# Patient Record
Sex: MALE | Race: BLACK OR AFRICAN AMERICAN | Employment: OTHER
[De-identification: names, ages, dates, MRNs, and addresses within clinical notes are randomized per-mention and may not be internally consistent; named-entity substitution may affect disease eponyms.]

---

## 2024-11-18 ENCOUNTER — CARE COORDINATION (OUTPATIENT)
Dept: OTHER | Facility: CLINIC | Age: 54
End: 2024-11-18

## 2024-11-18 RX ORDER — SERTRALINE HYDROCHLORIDE 100 MG/1
100 TABLET, FILM COATED ORAL PRN
COMMUNITY

## 2024-11-18 RX ORDER — SILDENAFIL 100 MG/1
100 TABLET, FILM COATED ORAL PRN
COMMUNITY

## 2024-11-18 RX ORDER — EZETIMIBE 10 MG/1
10 TABLET ORAL
COMMUNITY

## 2024-11-18 RX ORDER — CLONIDINE HYDROCHLORIDE 0.2 MG/1
0.2 TABLET ORAL
COMMUNITY

## 2024-11-18 RX ORDER — POTASSIUM CHLORIDE 1500 MG/1
40 TABLET, EXTENDED RELEASE ORAL DAILY
COMMUNITY

## 2024-11-18 RX ORDER — LEVOCETIRIZINE DIHYDROCHLORIDE 5 MG/1
TABLET, FILM COATED ORAL
COMMUNITY

## 2024-11-18 RX ORDER — AZELASTINE 1 MG/ML
1 SPRAY, METERED NASAL
COMMUNITY

## 2024-11-18 RX ORDER — AMLODIPINE BESYLATE 10 MG/1
5 TABLET ORAL DAILY
COMMUNITY

## 2024-11-18 RX ORDER — CARVEDILOL 25 MG/1
25 TABLET ORAL
COMMUNITY

## 2024-11-18 RX ORDER — TELMISARTAN 40 MG/1
80 TABLET ORAL
COMMUNITY

## 2024-11-18 RX ORDER — ASPIRIN 81 MG/1
81 TABLET ORAL DAILY
COMMUNITY

## 2024-11-18 NOTE — CARE COORDINATION
Ambulatory Care Coordination Note     2024 2:53 PM     Patient Current Location:  [place]     This patient was received as a referral from Population health report .    ACM contacted the patient by telephone. Verified name and  with patient as identifiers. Provided introduction to self, and explanation of the ACM role.   Patient accepted care management services at this time.          ACM: Bill Cordon RN     Challenges to be reviewed by the provider   Additional needs identified to be addressed with provider No  none               Method of communication with provider: none.    Utilization: N/A - Initial Call     Care Summary Note: ACM called patient for initial outreach after recent PF OV. Patient had OV with Dr. Marmolejo 10/24/24 for right thumb pain. Had xray which was negative for acute changes. Per PF OV note, patient was advised on hand exercises and to use Diclofenac cream (as prescribed per PCP @ [facility]). Patient's BP elevated at PF OV (162/96, 150/94).     Patient reports still with pain to right thumb. He reports received Diclofenac cream in the mail from [facility]. Patient initally reports nothing helping the thumb pain. In talking to him further, he mentioned has not yet used Diclofenac cream. ACM discussed use and advantages of Diclofenac cream; advised him to use it to see if he gets any relief. Patient states will start Diclofenac cream today. Patient reports received brace in the mail from [facility] which does not seem to help the pain. He reports [facility] provider told him if the prescribed treatments are not helping, to follow up as he may need referral to orthopedics for possible injection. ACM reinforced importance of calling [facility] provider to schedule follow up if no relief with the treatments that were ordered. He is going to see if he gets any relief from Diclofenac.     ACM questioned patient if he monitors BP at home due to elevated BP at PF OV. Patient initially said he does not like it that ACM can see

## 2024-12-05 ENCOUNTER — CARE COORDINATION (OUTPATIENT)
Dept: OTHER | Facility: CLINIC | Age: 54
End: 2024-12-05

## 2024-12-05 NOTE — CARE COORDINATION
Ambulatory Care Coordination Note     2024 3:40 PM     Patient Current Location:  [facility] contacted the patient by telephone. Verified name and  with patient as identifiers.     Patient graduated from the High Risk Care Management program on 2024.  Patient verbalizes confidence in the ability to self-manage at this time..  Care management goals have been completed. No further Ambulatory Care Manager follow up scheduled.      ACM: Bill Cordon RN     Challenges to be reviewed by the provider   Additional needs identified to be addressed with provider No  none               Method of communication with provider: none.    Utilization: Patient has not had any utilization since our last call.     Care Summary Note: AC called patient for CM follow up. Patient reports started using Diclofenac cream to right thumb site. States pain is no better and no worse. He had OV with Dr. Marmolejo (PCP) recently and discussed thumb pain. Patient states he does not accept arthritis diagnosis and thinks something else is going on. [facility] offered to call Dr. Marmolejo to request ortho referral. Patient declined offer and states has follow up scheduled with Dr. Marmolejo later this month and will ask him about it when he goes. Patient has nephrology follow up 12/10/24. Patient questioned [facility] why I am calling him and asked if this is something new that [facility] is doing. AC provided reason for the call and CM involvement. Questioned patient if he monitors BP at home (as it was elevated at  OV in October). Patient states BP is \"always high\" and that SBP normally runs 140s-150s. States he used to monitor BP at home but \"rarely\" does now. States he follows up with cardiology regularly. Patient reluctantly performed med rec with [facility]. He denies issues paying for medications as he gets med[facility] filled via [facility]. Patient does not want further [facility] outreach and states he is good and can manage on his own. Program closed; ACM signing